# Patient Record
Sex: MALE | Race: WHITE | NOT HISPANIC OR LATINO | Employment: OTHER | URBAN - NONMETROPOLITAN AREA
[De-identification: names, ages, dates, MRNs, and addresses within clinical notes are randomized per-mention and may not be internally consistent; named-entity substitution may affect disease eponyms.]

---

## 2017-01-24 ENCOUNTER — OFFICE VISIT (OUTPATIENT)
Dept: PODIATRY | Facility: CLINIC | Age: 77
End: 2017-01-24

## 2017-01-24 VITALS
DIASTOLIC BLOOD PRESSURE: 70 MMHG | HEART RATE: 73 BPM | SYSTOLIC BLOOD PRESSURE: 127 MMHG | BODY MASS INDEX: 37.73 KG/M2 | HEIGHT: 74 IN | OXYGEN SATURATION: 93 % | WEIGHT: 294 LBS

## 2017-01-24 DIAGNOSIS — M79.672 LEFT FOOT PAIN: ICD-10-CM

## 2017-01-24 DIAGNOSIS — L84 CORNS: Primary | ICD-10-CM

## 2017-01-24 DIAGNOSIS — M20.42 HAMMER TOE OF LEFT FOOT: ICD-10-CM

## 2017-01-24 PROCEDURE — 99202 OFFICE O/P NEW SF 15 MIN: CPT | Performed by: PODIATRIST

## 2017-01-24 PROCEDURE — 11055 PARING/CUTG B9 HYPRKER LES 1: CPT | Performed by: PODIATRIST

## 2017-01-24 RX ORDER — LISINOPRIL AND HYDROCHLOROTHIAZIDE 20; 12.5 MG/1; MG/1
TABLET ORAL
COMMUNITY
Start: 2016-12-27 | End: 2020-02-25 | Stop reason: ALTCHOICE

## 2017-01-24 RX ORDER — MECLIZINE HYDROCHLORIDE 25 MG/1
TABLET ORAL
COMMUNITY
Start: 2017-01-16

## 2017-01-24 NOTE — PROGRESS NOTES
"Miles Degroot  1940  76 y.o. male   Patient presents with a sore on his left foot.    01/24/2017  Chief Complaint   Patient presents with   • Left Foot - Wound Check           History of Present Illness    Mr Degroot is a 76-year-old male who presents for evaluation of left foot pain.  He states that he noticed a firm spots to the bottom of his foot near his little toe starting a little over a week ago.  He states that this is painful on pressure and with weightbearing.  He states that the onset coincided with him wearing cheaper every day shoes but the pain has improved with wearing his nicer Sunday shoes.  He denies any known trauma or stepping on foreign objects.  He denies any prior skin lesions in the past.        No past medical history on file.      No past surgical history on file.      No family history on file.      Social History     Social History   • Marital status:      Spouse name: N/A   • Number of children: N/A   • Years of education: N/A     Occupational History   • Not on file.     Social History Main Topics   • Smoking status: Not on file   • Smokeless tobacco: Not on file   • Alcohol use Not on file   • Drug use: Not on file   • Sexual activity: Not on file     Other Topics Concern   • Not on file     Social History Narrative         Current Outpatient Prescriptions   Medication Sig Dispense Refill   • lisinopril-hydrochlorothiazide (PRINZIDE,ZESTORETIC) 20-12.5 MG per tablet      • meclizine (ANTIVERT) 25 MG tablet        No current facility-administered medications for this visit.          OBJECTIVE    Visit Vitals   • /70   • Pulse 73   • Ht 74\" (188 cm)   • Wt 294 lb (133 kg)   • SpO2 93%   • BMI 37.75 kg/m2         Review of Systems   Constitutional:  Denies recent weight loss, weight gain, fever or chills, no change in exercise tolerance  Musculoskeletal: Foot pain.   Skin:  Callus  Neurological: Denies paresthesias.  Psychiatric/Behavioral: Denies " depression      Physical Exam   Constitutional: he appears well-developed and well-nourished.   HEENT: Normocephalic. Atraumatic  CV: No tenderness. RRR  Resp: Non-labored respiration. No wheezes.   Psychiatric: he has a normal mood and affect. his   behavior is normal.      Lower Extremity Exam:  Vascular: DP/PT pulses palpable 1+.   Negative hair growth.   Minimal perimalleolar edema  CFT wnl  Neuro: Protective sensation intact, b/l.  DTRs intact  Integument: No open wounds.  Several punctate IPK to left forefoot. +pain on palpation of small lesion near 5th mtpj  Web spaces c/d/i  Musculoskeletal: LE muscle strength 5/5.   Gait normal  Semi-rigid hammertoe deformity toes 2-5 b/l.  Nails 1-5 b/l thickened  Ankle ROM decreased              ASSESSMENT AND PLAN    Miles was seen today for wound check.    Diagnoses and all orders for this visit:    Corns    Left foot pain    Hammer toe of left foot      -Comprehensive foot and ankle exam performed  -Radiographs ordered and reviewed  -Educated pt on diagnosis, etiology and treatment of plantar IPKs related to increased plantar pressure secondary to hammertoe deformity.  -Above noted painful IPK debrided with 15 blade to epidermis without incident.  -Educated pt on proper shoe gear, soft insert selection as well as local padding techniques.  -Recheck as needed          This document has been electronically signed by Willis Jennings DPM on January 25, 2017 9:48 AM         Willis Jennings DPM  1/25/2017  9:48 AM

## 2017-10-16 ENCOUNTER — OFFICE VISIT (OUTPATIENT)
Dept: OTOLARYNGOLOGY | Facility: CLINIC | Age: 77
End: 2017-10-16

## 2017-10-16 VITALS — TEMPERATURE: 98.9 F | WEIGHT: 298 LBS | HEIGHT: 75 IN | BODY MASS INDEX: 37.05 KG/M2

## 2017-10-16 DIAGNOSIS — R49.0 VOICE HOARSENESSES: ICD-10-CM

## 2017-10-16 DIAGNOSIS — J38.7 PRESBYLARYNX: ICD-10-CM

## 2017-10-16 DIAGNOSIS — R06.02 SHORTNESS OF BREATH: Primary | ICD-10-CM

## 2017-10-16 PROCEDURE — 99203 OFFICE O/P NEW LOW 30 MIN: CPT | Performed by: OTOLARYNGOLOGY

## 2017-10-16 PROCEDURE — 31575 DIAGNOSTIC LARYNGOSCOPY: CPT | Performed by: OTOLARYNGOLOGY

## 2017-10-16 RX ORDER — ASPIRIN 81 MG/1
81 TABLET, CHEWABLE ORAL DAILY
COMMUNITY

## 2017-10-16 NOTE — PROGRESS NOTES
Subjective   Miles Degroot is a 77 y.o. male.   CC: hoarseness and shortness of breath  History of Present Illness   Patient is referred because a history of hoarseness it doesn't bother the patient that much area he isn't known to have COPD shortness of breath.  Is not having neck masses.  Stop smoking over 30 years ago.  He did chew tobacco more recently.  He didn't he denies sore throat or losing his voice completely.  He has no hemoptysis or neck masses..      The following portions of the patient's history were reviewed and updated as appropriate: allergies, current medications, past family history, past medical history, past social history, past surgical history and problem list.      Miles Degroot reports that he has quit smoking. He has never used smokeless tobacco.  Patient is not a tobacco user and has been counseled for use of tobacco products    Family History   Problem Relation Age of Onset   • Cancer Other    • Diabetes Other    • Heart disease Other          Current Outpatient Prescriptions:   •  aspirin 81 MG chewable tablet, Chew 81 mg Daily., Disp: , Rfl:   •  lisinopril-hydrochlorothiazide (PRINZIDE,ZESTORETIC) 20-12.5 MG per tablet, , Disp: , Rfl:   •  meclizine (ANTIVERT) 25 MG tablet, , Disp: , Rfl:   •  mometasone-formoterol (DULERA) 200-5 MCG/ACT inhaler, as needed, Disp: , Rfl:     Allergies   Allergen Reactions   • Contrast Dye      IVP DYE       Past Medical History:   Diagnosis Date   • Bleeding tendency    • Emphysema of lung    • High blood pressure    • Prostate cancer          Review of Systems   HENT: Positive for voice change. Negative for sore throat and trouble swallowing.    Respiratory: Positive for cough and shortness of breath.    Hematological: Negative for adenopathy.   All other systems reviewed and are negative.          Objective   Physical Exam   Constitutional: He is oriented to person, place, and time. He appears well-developed and well-nourished.   HENT:    Head: Normocephalic and atraumatic.   Right Ear: Hearing, tympanic membrane, external ear and ear canal normal.   Left Ear: Hearing, tympanic membrane, external ear and ear canal normal.   Nose: Nasal deformity present. No mucosal edema, rhinorrhea or septal deviation. No epistaxis. Right sinus exhibits no maxillary sinus tenderness and no frontal sinus tenderness. Left sinus exhibits no maxillary sinus tenderness and no frontal sinus tenderness.   Mouth/Throat: Uvula is midline, oropharynx is clear and moist and mucous membranes are normal. No trismus in the jaw. Normal dentition. No oropharyngeal exudate or posterior oropharyngeal edema. Tonsils are 0 on the right. Tonsils are 1+ on the left.   Neck: Trachea normal and normal range of motion. Neck supple. No JVD present. No tracheal deviation present. No thyromegaly present.       Cardiovascular: Normal rate.    Pulmonary/Chest: Effort normal.   Musculoskeletal: Normal range of motion.   Lymphadenopathy:        Head (right side): No submental, no submandibular, no tonsillar, no preauricular, no posterior auricular and no occipital adenopathy present.        Head (left side): No submental, no submandibular, no tonsillar, no preauricular, no posterior auricular and no occipital adenopathy present.     He has no cervical adenopathy.        Right cervical: No superficial cervical, no deep cervical and no posterior cervical adenopathy present.       Left cervical: No superficial cervical, no deep cervical and no posterior cervical adenopathy present.   Neurological: He is alert and oriented to person, place, and time. No cranial nerve deficit.   Skin: Skin is warm.   Psychiatric: He has a normal mood and affect. His speech is normal and behavior is normal. Thought content normal.   Nursing note and vitals reviewed.    Procedure Note    Pre-operative Diagnosis:   Chief Complaint   Patient presents with   • other     hoarseness       Post-operative Diagnosis:  same    Anesthesia: topical with xylocaine and neosynephrine    Endoscopy Type:  Flexible Laryngoscopy    Procedure Details:    The patient was placed in the sitting position.  After topical anesthesia and decongestion, the 4 mm laryngoscope was passed.  The nasal cavities, nasopharynx, oropharynx, hypopharynx, and larynx were all examined.  Vocal cords were examined during respiration and phonation.  The following findings were noted:    Findings:  Present the larynx good mobility of the vocal cords slight sagging.    Other significant abnormalities larynx hypopharynx epiglottis or base of tongue    Condition:  Stable.  Patient tolerated procedure well.    Complications:  None      Assessment/Plan   Miles was seen today for other.    Diagnoses and all orders for this visit:    Shortness of breath    Presbylarynx    Voice hoarsenesses       reassured no significant serious pathology.  Patient does not want to go through with speech therapy or surgical  Procedures.  Told him if this voice worsens only have to recheck I also did reassure him there is no evidence of cancer or other mass.

## 2019-08-06 ENCOUNTER — OFFICE VISIT (OUTPATIENT)
Dept: PODIATRY | Facility: CLINIC | Age: 79
End: 2019-08-06

## 2019-08-06 VITALS — HEIGHT: 75 IN | WEIGHT: 298 LBS | BODY MASS INDEX: 37.05 KG/M2 | OXYGEN SATURATION: 98 % | HEART RATE: 89 BPM

## 2019-08-06 DIAGNOSIS — B35.1 ONYCHOMYCOSIS: Primary | ICD-10-CM

## 2019-08-06 DIAGNOSIS — M79.674 PAIN OF TOE OF RIGHT FOOT: ICD-10-CM

## 2019-08-06 PROCEDURE — 99212 OFFICE O/P EST SF 10 MIN: CPT | Performed by: PODIATRIST

## 2019-08-06 NOTE — PROGRESS NOTES
"Miles Degroot  1940  79 y.o. male     Patient presents to clinic with nail care 8/06/19 08/06/2019  Chief Complaint   Patient presents with   • Left Foot - nail care   • Right Foot - nail care           History of Present Illness    Miles Degroot is a 79 y.o. male            Past Medical History:   Diagnosis Date   • Bleeding tendency (CMS/HCC)    • Emphysema of lung (CMS/HCC)    • High blood pressure    • Prostate cancer (CMS/HCC)          Past Surgical History:   Procedure Laterality Date   • EYE SURGERY     • TOTAL KNEE ARTHROPLASTY           Family History   Problem Relation Age of Onset   • Cancer Other    • Diabetes Other    • Heart disease Other          Social History     Socioeconomic History   • Marital status:      Spouse name: Not on file   • Number of children: Not on file   • Years of education: Not on file   • Highest education level: Not on file   Tobacco Use   • Smoking status: Former Smoker   • Smokeless tobacco: Never Used         Current Outpatient Medications   Medication Sig Dispense Refill   • aspirin 81 MG chewable tablet Chew 81 mg Daily.     • lisinopril-hydrochlorothiazide (PRINZIDE,ZESTORETIC) 20-12.5 MG per tablet      • meclizine (ANTIVERT) 25 MG tablet      • mometasone-formoterol (DULERA) 200-5 MCG/ACT inhaler as needed       No current facility-administered medications for this visit.          OBJECTIVE    Pulse 89   Ht 189.2 cm (74.5\")   Wt 135 kg (298 lb)   SpO2 98%   BMI 37.75 kg/m²       Review of Systems      ***            ASSESSMENT AND PLAN    There are no diagnoses linked to this encounter.          This document has been electronically signed by Elaine Brown MA on August 6, 2019 4:26 PM     EMR Dragon/Transcription disclaimer:   Much of this encounter note is an electronic transcription/translation of spoken language to printed text. The electronic translation of spoken language may permit erroneous, or at times, nonsensical words or phrases to be " inadvertently transcribed; Although I have reviewed the note for such errors, some may still exist.    Elaine Brown MA  8/6/2019  4:26 PM

## 2019-08-07 NOTE — PROGRESS NOTES
"Miles Degroot  1940  79 y.o. male   Patient presents with right great toenail issues, pain    08/06/2019    Chief Complaint   Patient presents with   • Left Foot - nail care   • Right Foot - nail care           History of Present Illness    Mr Degroot is a 79-year-old male last seen 2017 for an unrelated issue presents today for evaluation of issues with bilateral big toes.  He feels that he has an excess amount of skin under his toes which makes them difficult to trim and care for.  He states that he tried to scrape some of the skin off the tip of his right toe a couple weeks ago and started to bleed and he believes getting infected.  He did begin soaking and applied topical antibiotic which she states improved the problem.    Past Medical History:   Diagnosis Date   • Bleeding tendency (CMS/HCC)    • Emphysema of lung (CMS/HCC)    • High blood pressure    • Prostate cancer (CMS/HCC)          Past Surgical History:   Procedure Laterality Date   • EYE SURGERY     • TOTAL KNEE ARTHROPLASTY           Family History   Problem Relation Age of Onset   • Cancer Other    • Diabetes Other    • Heart disease Other          Social History     Socioeconomic History   • Marital status:      Spouse name: Not on file   • Number of children: Not on file   • Years of education: Not on file   • Highest education level: Not on file   Tobacco Use   • Smoking status: Former Smoker   • Smokeless tobacco: Never Used         Current Outpatient Medications   Medication Sig Dispense Refill   • aspirin 81 MG chewable tablet Chew 81 mg Daily.     • lisinopril-hydrochlorothiazide (PRINZIDE,ZESTORETIC) 20-12.5 MG per tablet      • meclizine (ANTIVERT) 25 MG tablet      • mometasone-formoterol (DULERA) 200-5 MCG/ACT inhaler as needed       No current facility-administered medications for this visit.          OBJECTIVE    Pulse 89   Ht 189.2 cm (74.5\")   Wt 135 kg (298 lb)   SpO2 98%   BMI 37.75 kg/m²       Review of Systems "   Constitutional:  Denies recent weight loss, weight gain, fever or chills, no change in exercise tolerance  Musculoskeletal: Foot pain.   Skin:  Callus  Neurological: Denies paresthesias.  Psychiatric/Behavioral: Denies depression      Physical Exam   Constitutional: he appears well-developed and well-nourished.   HEENT: Normocephalic. Atraumatic  CV: No tenderness. RRR  Resp: Non-labored respiration. No wheezes.   Psychiatric: he has a normal mood and affect. his   behavior is normal.      Lower Extremity Exam:  Vascular: DP/PT pulses palpable 1+.   Negative hair growth.   Minimal perimalleolar edema  CFT wnl  Neuro: Protective sensation intact, b/l.  DTRs intact  Integument: No open wounds.  Mild onychomycosis to bilateral great toes with incurvated medial lateral nail edges.  This does protrude the nail bed.  No open wounds.  No erythema.  No tenderness palpation.  Web spaces c/d/i  Musculoskeletal: LE muscle strength 5/5.   Gait normal  Semi-rigid hammertoe deformity toes 2-5 b/l.  Nails 1-5 b/l thickened  Ankle ROM decreased              ASSESSMENT AND PLAN    Miles was seen today for nail care and nail care.    Diagnoses and all orders for this visit:    Onychomycosis    Pain of toe of right foot      -Comprehensive foot and ankle exam performed  -Educated patient on findings of bilateral nails which are slightly thickened due to mild onychomycosis as well as incurvated which causes anterior displacement of the nail bed.  I advised him that his treatment options are continued intermittent debridement and care not to cut the skin versus total nail removal.  Patient states is not bothersome enough to warrant nail removal at this time.  -Recheck as needed          This document has been electronically signed by Willis Jennings DPM on August 7, 2019 10:25 AM         Willis Jennings DPM  8/7/2019  10:25 AM

## 2020-02-21 DIAGNOSIS — R05.9 COUGH: Primary | ICD-10-CM

## 2020-02-24 ENCOUNTER — OFFICE VISIT (OUTPATIENT)
Dept: PULMONOLOGY | Facility: CLINIC | Age: 80
End: 2020-02-24

## 2020-02-24 ENCOUNTER — PROCEDURE VISIT (OUTPATIENT)
Dept: PULMONOLOGY | Facility: CLINIC | Age: 80
End: 2020-02-24

## 2020-02-24 ENCOUNTER — HOSPITAL ENCOUNTER (OUTPATIENT)
Dept: GENERAL RADIOLOGY | Facility: HOSPITAL | Age: 80
Discharge: HOME | End: 2020-02-24
Admitting: INTERNAL MEDICINE

## 2020-02-24 VITALS
BODY MASS INDEX: 37.1 KG/M2 | OXYGEN SATURATION: 96 % | WEIGHT: 298.4 LBS | HEIGHT: 75 IN | SYSTOLIC BLOOD PRESSURE: 160 MMHG | HEART RATE: 98 BPM | DIASTOLIC BLOOD PRESSURE: 84 MMHG

## 2020-02-24 DIAGNOSIS — R05.9 COUGH: ICD-10-CM

## 2020-02-24 DIAGNOSIS — J42 CHRONIC BRONCHITIS, UNSPECIFIED CHRONIC BRONCHITIS TYPE (HCC): Primary | ICD-10-CM

## 2020-02-24 PROCEDURE — 99204 OFFICE O/P NEW MOD 45 MIN: CPT | Performed by: INTERNAL MEDICINE

## 2020-02-24 PROCEDURE — 94010 BREATHING CAPACITY TEST: CPT | Performed by: INTERNAL MEDICINE

## 2020-02-24 PROCEDURE — 96372 THER/PROPH/DIAG INJ SC/IM: CPT | Performed by: INTERNAL MEDICINE

## 2020-02-24 PROCEDURE — 71046 X-RAY EXAM CHEST 2 VIEWS: CPT

## 2020-02-24 RX ORDER — OMEGA-3 FATTY ACIDS/FISH OIL 300-1000MG
200 CAPSULE ORAL
COMMUNITY

## 2020-02-24 RX ORDER — DIAZEPAM 10 MG/1
TABLET ORAL
COMMUNITY
Start: 2020-01-10

## 2020-02-24 RX ORDER — DOXYCYCLINE HYCLATE 100 MG/1
CAPSULE ORAL
COMMUNITY
Start: 2020-02-18 | End: 2020-10-28

## 2020-02-24 RX ORDER — METHYLPREDNISOLONE ACETATE 40 MG/ML
40 INJECTION, SUSPENSION INTRA-ARTICULAR; INTRALESIONAL; INTRAMUSCULAR; SOFT TISSUE ONCE
Status: DISCONTINUED | OUTPATIENT
Start: 2020-02-24 | End: 2020-06-09

## 2020-02-24 RX ORDER — METHYLPREDNISOLONE ACETATE 80 MG/ML
80 INJECTION, SUSPENSION INTRA-ARTICULAR; INTRALESIONAL; INTRAMUSCULAR; SOFT TISSUE ONCE
Status: DISCONTINUED | OUTPATIENT
Start: 2020-02-24 | End: 2020-02-24

## 2020-02-24 RX ORDER — DOCUSATE SODIUM 100 MG/1
100 CAPSULE, LIQUID FILLED ORAL
COMMUNITY

## 2020-02-24 RX ORDER — MONTELUKAST SODIUM 10 MG/1
TABLET ORAL
COMMUNITY
Start: 2019-12-27

## 2020-02-24 RX ORDER — ALBUTEROL SULFATE 90 UG/1
AEROSOL, METERED RESPIRATORY (INHALATION)
COMMUNITY
Start: 2019-12-27

## 2020-02-24 RX ORDER — PREDNISONE 10 MG/1
TABLET ORAL
Qty: 21 TABLET | Refills: 0 | Status: SHIPPED | OUTPATIENT
Start: 2020-02-24 | End: 2020-10-28

## 2020-02-24 NOTE — PROGRESS NOTES
Miles Degroot is a 79 y.o. male.     Chief Complaint   Patient presents with   • Establish Care       History of Present Illness   This 79-year-old gentleman is here for breathing evaluation.  He has had shortness of breath for the last 10 years characterized by cough wheeze dyspnea on walking a few 100 feet and sputum production.  He also has a history of melanoma high blood pressure and arthritis.  Surgical history prostate biopsy knee replacements.  Medications please see his list.  Medication allergies contrast dye.  Family history is positive for cancer lung disease and diabetes.  Social history he worked in around the [email or web address] for a number of years and smokes cigarettes for at least 24 years.  He denies chest pain or hemoptysis    The following portions of the patient's history were reviewed and updated as appropriate: allergies, current medications, past family history, past medical history, past social history, past surgical history and problem list.    Review of Systems   Constitutional: Negative for activity change, chills, fatigue, fever and unexpected weight change.   HENT: Negative for congestion, dental problem, ear discharge, ear pain, facial swelling, hearing loss, nosebleeds, postnasal drip, rhinorrhea, sinus pressure, sneezing, sore throat, tinnitus, trouble swallowing and voice change.    Eyes: Negative.  Negative for photophobia, pain, discharge, redness, itching and visual disturbance.   Respiratory: Positive for cough, chest tightness, shortness of breath and wheezing.    Cardiovascular: Negative for chest pain, palpitations and leg swelling.   Gastrointestinal: Negative for abdominal distention, abdominal pain and vomiting.   Endocrine: Negative.  Negative for cold intolerance, heat intolerance, polydipsia and polyphagia.   Genitourinary: Positive for frequency. Negative for decreased urine volume, dysuria, enuresis, flank pain, hematuria and urgency.   Musculoskeletal: Positive for  "arthralgias. Negative for back pain, gait problem, joint swelling, myalgias and neck pain.   Skin: Negative for color change, pallor, rash and wound.   Allergic/Immunologic: Negative.  Negative for environmental allergies, food allergies and immunocompromised state.   Neurological: Negative.  Negative for dizziness, tremors, seizures, syncope, facial asymmetry, speech difficulty, weakness, light-headedness, numbness and headaches.   Hematological: Negative for adenopathy. Does not bruise/bleed easily.   Psychiatric/Behavioral: Negative for agitation, behavioral problems, confusion, decreased concentration, hallucinations and self-injury. The patient is not hyperactive.    All other systems reviewed and are negative.      /84 (BP Location: Left arm, Patient Position: Sitting, Cuff Size: Adult)   Pulse 98   Ht 189.2 cm (74.5\")   Wt 135 kg (298 lb 6.4 oz)   SpO2 96%   BMI 37.80 kg/m²   Physical Exam   Constitutional: He appears well-developed and well-nourished. Distressed:  Dyspneic white male in a transport chair mild respiratory distress, mild cough, patient walks with an antalgic gait.   HENT:   Head: Normocephalic.   Mouth/Throat: No oropharyngeal exudate.   Eyes: Pupils are equal, round, and reactive to light. Conjunctivae are normal. Right eye exhibits no discharge. Left eye exhibits no discharge. No scleral icterus.   Neck: Normal range of motion. Neck supple. No JVD present. No tracheal deviation present. No thyromegaly present.   Cardiovascular: Normal rate, regular rhythm, normal heart sounds and intact distal pulses. Exam reveals no gallop and no friction rub.   No murmur heard.  Pulmonary/Chest: He is in respiratory distress. He has wheezes. He has rales. He exhibits no tenderness.   Abdominal: Bowel sounds are normal. He exhibits no distension and no mass. There is no tenderness. There is no guarding.   Musculoskeletal: He exhibits no tenderness or deformity.   Lymphadenopathy:     He has no " cervical adenopathy.   Neurological: He is alert. He has normal reflexes. He displays normal reflexes. No cranial nerve deficit. He exhibits normal muscle tone. Coordination normal.   Skin: Skin is warm and dry. No rash noted. He is not diaphoretic. No pallor.   Psychiatric: He has a normal mood and affect. His behavior is normal. Judgment and thought content normal.     Chest x-ray mild increased markings no masses or infiltrates, spirometry reveals moderate to severe obstruction    Assessment/Plan   Milse was seen today for establish care.    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

## 2020-02-25 ENCOUNTER — TELEPHONE (OUTPATIENT)
Dept: PULMONOLOGY | Facility: CLINIC | Age: 80
End: 2020-02-25

## 2020-02-25 RX ORDER — AMPICILLIN TRIHYDRATE 250 MG
500 CAPSULE ORAL 2 TIMES DAILY
COMMUNITY

## 2020-02-25 RX ORDER — CHLORAL HYDRATE 500 MG
1000 CAPSULE ORAL 2 TIMES DAILY
COMMUNITY

## 2020-02-25 RX ORDER — PROMETHAZINE HYDROCHLORIDE AND CODEINE PHOSPHATE 6.25; 1 MG/5ML; MG/5ML
1 SOLUTION ORAL EVERY 6 HOURS PRN
COMMUNITY
Start: 2019-12-03 | End: 2020-10-28

## 2020-02-25 RX ORDER — TAMSULOSIN HYDROCHLORIDE 0.4 MG/1
0.4 CAPSULE ORAL DAILY
COMMUNITY
Start: 2019-12-27

## 2020-02-25 RX ORDER — OXYBUTYNIN CHLORIDE 10 MG/1
10 TABLET, EXTENDED RELEASE ORAL DAILY
COMMUNITY
Start: 2019-12-27 | End: 2020-10-28

## 2020-02-25 RX ORDER — LOSARTAN POTASSIUM 100 MG/1
100 TABLET ORAL DAILY
COMMUNITY
Start: 2019-10-10 | End: 2020-10-28

## 2020-02-25 RX ORDER — AMLODIPINE BESYLATE 5 MG/1
5 TABLET ORAL DAILY
COMMUNITY
Start: 2020-02-18 | End: 2020-10-28

## 2020-02-25 NOTE — TELEPHONE ENCOUNTER
Called Elaine back and since the script is almost the same as Dr Crain's, the insurance will not authorize the new script at this time. I talked with the pt and Elaine and all agreed to wait until the current script is out and then see how pt is feeling. If need be, he can  the next script after that time. I reminded pt of his upcoming appt while talking with his wife on the phone and updated the medication list. I told pt's wife to give us a call if he needs anything else. She wanted us to note that he was seen in urgent care yesterday due to rib pain after coughing hard. He was given some cough syrup and was told that he probably pulled a muscle from coughing so hard.   (The visit is now on his chart through care everywhere.)

## 2020-02-25 NOTE — TELEPHONE ENCOUNTER
----- Message from Amalia Herrera sent at 2/25/2020  9:04 AM CST -----  Regarding:  Dr Crain patient  Elaine with [facility] has called stating Dr Crain ordered a prescription for PREDNISONE for this patient yesterday.  She wants to let Dr Crain know that Dr Gasca also ordered this RX on the 18th.  Please return her call with further instructions.  The # is [phone].  Thank you

## 2020-03-02 ENCOUNTER — APPOINTMENT (OUTPATIENT)
Dept: CT IMAGING | Facility: HOSPITAL | Age: 80
End: 2020-03-02

## 2020-03-02 ENCOUNTER — HOSPITAL ENCOUNTER (EMERGENCY)
Facility: HOSPITAL | Age: 80
Discharge: HOME | End: 2020-03-02
Attending: EMERGENCY MEDICINE | Admitting: EMERGENCY MEDICINE

## 2020-03-02 VITALS
DIASTOLIC BLOOD PRESSURE: 94 MMHG | HEIGHT: 75 IN | BODY MASS INDEX: 36.56 KG/M2 | SYSTOLIC BLOOD PRESSURE: 176 MMHG | HEART RATE: 81 BPM | TEMPERATURE: 98.2 F | WEIGHT: 294 LBS | OXYGEN SATURATION: 92 % | RESPIRATION RATE: 20 BRPM

## 2020-03-02 DIAGNOSIS — J90 PLEURAL EFFUSION, LEFT: ICD-10-CM

## 2020-03-02 DIAGNOSIS — K80.20 CALCULUS OF GALLBLADDER WITHOUT CHOLECYSTITIS WITHOUT OBSTRUCTION: ICD-10-CM

## 2020-03-02 DIAGNOSIS — S30.1XXA HEMATOMA OF FLANK: Primary | ICD-10-CM

## 2020-03-02 DIAGNOSIS — D64.9 ANEMIA, UNSPECIFIED TYPE: ICD-10-CM

## 2020-03-02 LAB
ALBUMIN SERPL-MCNC: 3.8 G/DL (ref 3.5–5.2)
ALBUMIN/GLOB SERPL: 1.4 G/DL
ALP SERPL-CCNC: 72 U/L (ref 39–117)
ALT SERPL W P-5'-P-CCNC: 16 U/L (ref 1–41)
ANION GAP SERPL CALCULATED.3IONS-SCNC: 11 MMOL/L (ref 5–15)
APTT PPP: 25.6 SECONDS (ref 20–40.3)
AST SERPL-CCNC: 20 U/L (ref 1–40)
BASOPHILS # BLD AUTO: 0.05 10*3/MM3 (ref 0–0.2)
BASOPHILS NFR BLD AUTO: 0.3 % (ref 0–1.5)
BILIRUB SERPL-MCNC: 0.9 MG/DL (ref 0.2–1.2)
BILIRUB UR QL STRIP: NEGATIVE
BUN BLD-MCNC: 21 MG/DL (ref 8–23)
BUN/CREAT SERPL: 21.9 (ref 7–25)
CALCIUM SPEC-SCNC: 9.2 MG/DL (ref 8.6–10.5)
CHLORIDE SERPL-SCNC: 101 MMOL/L (ref 98–107)
CLARITY UR: CLEAR
CO2 SERPL-SCNC: 25 MMOL/L (ref 22–29)
COLOR UR: YELLOW
CREAT BLD-MCNC: 0.96 MG/DL (ref 0.76–1.27)
DEPRECATED RDW RBC AUTO: 45.8 FL (ref 37–54)
EOSINOPHIL # BLD AUTO: 0.02 10*3/MM3 (ref 0–0.4)
EOSINOPHIL NFR BLD AUTO: 0.1 % (ref 0.3–6.2)
ERYTHROCYTE [DISTWIDTH] IN BLOOD BY AUTOMATED COUNT: 14.2 % (ref 12.3–15.4)
GFR SERPL CREATININE-BSD FRML MDRD: 76 ML/MIN/1.73
GLOBULIN UR ELPH-MCNC: 2.7 GM/DL
GLUCOSE BLD-MCNC: 143 MG/DL (ref 65–99)
GLUCOSE UR STRIP-MCNC: NEGATIVE MG/DL
HCT VFR BLD AUTO: 32.3 % (ref 37.5–51)
HGB BLD-MCNC: 10.6 G/DL (ref 13–17.7)
HGB UR QL STRIP.AUTO: NEGATIVE
HOLD SPECIMEN: NORMAL
IMM GRANULOCYTES # BLD AUTO: 0.31 10*3/MM3 (ref 0–0.05)
IMM GRANULOCYTES NFR BLD AUTO: 2 % (ref 0–0.5)
INR PPP: 1.02 (ref 0.8–1.2)
KETONES UR QL STRIP: NEGATIVE
LEUKOCYTE ESTERASE UR QL STRIP.AUTO: NEGATIVE
LYMPHOCYTES # BLD AUTO: 1.36 10*3/MM3 (ref 0.7–3.1)
LYMPHOCYTES NFR BLD AUTO: 8.7 % (ref 19.6–45.3)
MCH RBC QN AUTO: 29.1 PG (ref 26.6–33)
MCHC RBC AUTO-ENTMCNC: 32.8 G/DL (ref 31.5–35.7)
MCV RBC AUTO: 88.7 FL (ref 79–97)
MONOCYTES # BLD AUTO: 1.01 10*3/MM3 (ref 0.1–0.9)
MONOCYTES NFR BLD AUTO: 6.5 % (ref 5–12)
NEUTROPHILS # BLD AUTO: 12.83 10*3/MM3 (ref 1.7–7)
NEUTROPHILS NFR BLD AUTO: 82.4 % (ref 42.7–76)
NITRITE UR QL STRIP: NEGATIVE
NRBC BLD AUTO-RTO: 0.1 /100 WBC (ref 0–0.2)
PH UR STRIP.AUTO: 7 [PH] (ref 5–9)
PLATELET # BLD AUTO: 372 10*3/MM3 (ref 140–450)
PMV BLD AUTO: 8.7 FL (ref 6–12)
POTASSIUM BLD-SCNC: 4 MMOL/L (ref 3.5–5.2)
PROT SERPL-MCNC: 6.5 G/DL (ref 6–8.5)
PROT UR QL STRIP: NEGATIVE
PROTHROMBIN TIME: 13.2 SECONDS (ref 11.1–15.3)
RBC # BLD AUTO: 3.64 10*6/MM3 (ref 4.14–5.8)
SODIUM BLD-SCNC: 137 MMOL/L (ref 136–145)
SP GR UR STRIP: 1.01 (ref 1–1.03)
UROBILINOGEN UR QL STRIP: NORMAL
WBC NRBC COR # BLD: 15.58 10*3/MM3 (ref 3.4–10.8)

## 2020-03-02 PROCEDURE — 85025 COMPLETE CBC W/AUTO DIFF WBC: CPT | Performed by: EMERGENCY MEDICINE

## 2020-03-02 PROCEDURE — 74176 CT ABD & PELVIS W/O CONTRAST: CPT

## 2020-03-02 PROCEDURE — 85610 PROTHROMBIN TIME: CPT | Performed by: EMERGENCY MEDICINE

## 2020-03-02 PROCEDURE — 80053 COMPREHEN METABOLIC PANEL: CPT | Performed by: EMERGENCY MEDICINE

## 2020-03-02 PROCEDURE — 85730 THROMBOPLASTIN TIME PARTIAL: CPT | Performed by: EMERGENCY MEDICINE

## 2020-03-02 PROCEDURE — 71250 CT THORAX DX C-: CPT

## 2020-03-02 PROCEDURE — 99284 EMERGENCY DEPT VISIT MOD MDM: CPT

## 2020-03-02 PROCEDURE — 81003 URINALYSIS AUTO W/O SCOPE: CPT | Performed by: EMERGENCY MEDICINE

## 2020-03-02 RX ORDER — SODIUM CHLORIDE 0.9 % (FLUSH) 0.9 %
10 SYRINGE (ML) INJECTION AS NEEDED
Status: DISCONTINUED | OUTPATIENT
Start: 2020-03-02 | End: 2020-03-02 | Stop reason: HOSPADM

## 2020-03-02 RX ORDER — HYDROCODONE BITARTRATE AND ACETAMINOPHEN 5; 325 MG/1; MG/1
1 TABLET ORAL ONCE
Status: COMPLETED | OUTPATIENT
Start: 2020-03-02 | End: 2020-03-02

## 2020-03-02 RX ADMIN — HYDROCODONE BITARTRATE AND ACETAMINOPHEN 1 TABLET: 5; 325 TABLET ORAL at 16:57

## 2020-03-02 NOTE — DISCHARGE INSTRUCTIONS
Return ED fever, chest pain, abdominal pain, shortness of air, bleeding, worse condition, other concerns

## 2020-03-02 NOTE — ED NOTES
Pt coughed the other day and felt a pop on his left side. Pt is now beginning to see a bruise around the side he felt pop.      Alysha Singh  03/02/20 [id number]

## 2020-03-02 NOTE — ED PROVIDER NOTES
Subjective   78yo male pmh significant copd/htn/bph presents ED c/o 1wk hx left flank hematoma extending to pelvis/lower abdominal wall/LLE over the past 1 week.  Pt notes onset after vigorous coughing episode et brief discomfort left flank.  ROS neg fever/chills/chest pain/abd pain/coagulopathy/melena/hematochoezia/hematemesis/hematuria.      History provided by:  Patient  Illness   Severity:  Moderate  Duration:  1 week  Chronicity:  New      Review of Systems   Constitutional: Negative.    HENT: Negative.    Respiratory: Negative.    Cardiovascular: Negative.    Gastrointestinal: Negative.    Genitourinary: Negative.    Musculoskeletal: Negative.    Skin: Positive for color change.   All other systems reviewed and are negative.      Past Medical History:   Diagnosis Date   • Bleeding tendency (CMS/HCC)    • Emphysema of lung (CMS/HCC)    • High blood pressure    • Prostate cancer (CMS/HCC)        Allergies   Allergen Reactions   • Contrast Dye      IVP DYE       Past Surgical History:   Procedure Laterality Date   • EYE SURGERY     • TOTAL KNEE ARTHROPLASTY         Family History   Problem Relation Age of Onset   • Cancer Other    • Diabetes Other    • Heart disease Other        Social History     Socioeconomic History   • Marital status:      Spouse name: Not on file   • Number of children: Not on file   • Years of education: Not on file   • Highest education level: Not on file   Tobacco Use   • Smoking status: Former Smoker     Last attempt to quit: 1982     Years since quittin.1   • Smokeless tobacco: Never Used   Substance and Sexual Activity   • Alcohol use: Yes     Comment: shot whiskey 2-3 nights/ week   • Drug use: Defer   • Sexual activity: Defer           Objective   Physical Exam   Constitutional: He is oriented to person, place, and time. He appears well-developed and well-nourished.   HENT:   Head: Normocephalic and atraumatic.   Mouth/Throat: Oropharynx is clear and moist.   Eyes:  Pupils are equal, round, and reactive to light.   Neck: Normal range of motion. Neck supple. No JVD present. No tracheal deviation present.   Cardiovascular: Normal rate, regular rhythm, normal heart sounds and intact distal pulses. Exam reveals no gallop and no friction rub.   No murmur heard.  Pulmonary/Chest: Effort normal and breath sounds normal. He has no wheezes. He has no rales.   Abdominal: Soft. Bowel sounds are normal. He exhibits no distension and no mass. There is no tenderness. There is no rebound and no guarding.   Musculoskeletal: He exhibits no edema.   Lymphadenopathy:     He has no cervical adenopathy.   Neurological: He is alert and oriented to person, place, and time.   Skin: Skin is warm and dry. Ecchymosis noted.        Nursing note and vitals reviewed.      Procedures           ED Course      Labs Reviewed   COMPREHENSIVE METABOLIC PANEL - Abnormal; Notable for the following components:       Result Value    Glucose 143 (*)     All other components within normal limits    Narrative:     GFR Normal >60  Chronic Kidney Disease <60  Kidney Failure <15     CBC WITH AUTO DIFFERENTIAL - Abnormal; Notable for the following components:    WBC 15.58 (*)     RBC 3.64 (*)     Hemoglobin 10.6 (*)     Hematocrit 32.3 (*)     Neutrophil % 82.4 (*)     Lymphocyte % 8.7 (*)     Eosinophil % 0.1 (*)     Immature Grans % 2.0 (*)     Neutrophils, Absolute 12.83 (*)     Monocytes, Absolute 1.01 (*)     Immature Grans, Absolute 0.31 (*)     All other components within normal limits   PROTIME-INR - Normal    Narrative:     Therapeutic range for most indications is 2.0-3.0 INR,  or 2.5-3.5 for mechanical heart valves.   APTT - Normal    Narrative:     The recommended Heparin therapeutic range is 68-97 seconds.   URINALYSIS W/ MICROSCOPIC IF INDICATED (NO CULTURE) - Normal    Narrative:     Urine microscopic not indicated.   CBC AND DIFFERENTIAL    Narrative:     The following orders were created for panel order CBC  & Differential.  Procedure                               Abnormality         Status                     ---------                               -----------         ------                     CBC Auto Differential[[id number]]        Abnormal            Final result                 Please view results for these tests on the individual orders.   EXTRA TUBES    Narrative:     The following orders were created for panel order Extra Tubes.  Procedure                               Abnormality         Status                     ---------                               -----------         ------                     Gold Top - SST[[id number]]                                   Final result                 Please view results for these tests on the individual orders.   GOLD TOP - SST     Xr Chest 2 View    Result Date: 2/24/2020  Narrative: [facility] [place] Patient Name: ELZA PALOMINO ATTENDING: REFERRING: ROOSEVELT RENTERIA ORDERING: ROOSEVELT RENTERIA ----------------------- PROCEDURE: Chest, PA and lateral DATE OF EXAM: 2/24/2020 HISTORY: Cough PA and lateral views of the chest were obtained. No previous studies are available for comparison. FINDINGS: The lungs are satisfactorily expanded and clear of infiltrates or effusions. There is hyperexpansion of the lungs with flattening of the hemidiaphragms. The heart size is normal. The vasculature does not appear congested and costophrenic angles are clear.     Impression: Hyperinflation. No active disease. Electronically signed by:  Kodak Negrete MD  2/24/2020 8:41 AM CST Workstation: [id number]    Ct Chest Without Contrast    Result Date: 3/2/2020  Narrative: PROCEDURE: CT chest abdomen and pelvis without contrast REASON FOR EXAM: ecchymoses This exam was performed according to our departmental dose-optimization program, which includes automated exposure control, adjustment of the mA and/or kV according to patient size and/or use of iterative reconstruction technique.  FINDINGS:  Axial computer tomography sequential imaging was performed from the thoracic inlet through the symphysis pubis without administration of IV contrast. .Sagittal and coronal reformation was performed . Mediastinal structures of the chest are normal. There is no lymphadenopathy or pericardial effusion. Very small left pleural effusion. Pleural spaces are otherwise unremarkable. Right lung base small linear opacity. Lungs are otherwise clear. 2 posterior segment right lobe of liver small oval hypodense lesions which have Hounsfield unit below 20 with the largest measuring 2.4 cm in greatest diameter. These would be consistent with benign hepatic cyst. The liver is otherwise unremarkable. Multiple small gallstones are seen dependently within the gallbladder. The gallbladder is otherwise unremarkable. The biliary system is normal. The pancreas is normal. The spleen is normal. Bilateral adrenal glands are normal. Right kidney mid zone 2.1 cm simple renal cortical cyst. The right kidney and ureter are otherwise unremarkable. The left kidney and ureter are normal. The bladder is normal. The prostate appears within normal limits. The hollow viscera is normal. The appendix is not definitively identified. However, there are no inflammatory changes around the cecum to suggest abnormality. No lymphadenopathy in the abdomen or the pelvis. No acute osseous abnormality. Nonspecific left abdominal wall internal abdominal oblique muscle, external abdominal oblique muscle, and transverse abdominal muscle focal fullness in the upper and midabdomen.     Impression: 1. Very small left pleural effusion. 2. Right lung base small linear opacities suspicious for discoid atelectasis. 3. 2 posterior segment right lobe of liver benign hepatic cyst. 4. Cholelithiasis. 5.Nonspecific left abdominal wall internal abdominal oblique muscle, external abdominal oblique muscle, and transverse abdominal muscle focal fullness in the upper and  midabdomen. The differential for this finding includes intramuscular hematoma versus less likely metastatic disease or peritoneal metastatic disease. Recommend clinical correlation. 6. Right kidney mid zone small simple renal cortical cyst. 7. Otherwise unremarkable unenhanced CT abdomen pelvis study. Electronically signed by:  Wes Daily MD  3/2/2020 4:37 PM CST Workstation: [id number]                                         [facility]    Final diagnoses:   Hematoma of flank   Anemia, unspecified type   Calculus of gallbladder without cholecystitis without obstruction   Pleural effusion, left            Bravo Fish MD  03/02/20 1021

## 2020-03-02 NOTE — ED TRIAGE NOTES
Pt was seen by pulmonologist 1 week ago and dx COPD. Pt coughed sometime after that appointment and had severe pain to his left flank immediately. He was seen at  later that day and received xr; no rib fx identified. Pt began having bruising to left flank 5 days ago, which has since expanded down to entire pelvic area.

## 2020-03-09 ENCOUNTER — OFFICE VISIT (OUTPATIENT)
Dept: PULMONOLOGY | Facility: CLINIC | Age: 80
End: 2020-03-09

## 2020-03-09 VITALS
WEIGHT: 300 LBS | BODY MASS INDEX: 37.3 KG/M2 | HEIGHT: 75 IN | DIASTOLIC BLOOD PRESSURE: 72 MMHG | SYSTOLIC BLOOD PRESSURE: 130 MMHG | OXYGEN SATURATION: 97 % | HEART RATE: 83 BPM

## 2020-03-09 DIAGNOSIS — J44.1 CHRONIC OBSTRUCTIVE PULMONARY DISEASE WITH ACUTE EXACERBATION (HCC): Primary | ICD-10-CM

## 2020-03-09 PROCEDURE — 99213 OFFICE O/P EST LOW 20 MIN: CPT | Performed by: INTERNAL MEDICINE

## 2020-03-09 NOTE — PROGRESS NOTES
"This gentleman has COPD with a recent exacerbation.  He is on a tapering dose of prednisone and his breathing has improved.  He is scheduled for cataract surgery in the near future    ROS    Constitutional-no night sweats weight loss headaches  GI no abdominal pain nausea or diarrhea  Neuro no seizure or neurologic deficits  Musculoskeletal no deformity or joint pain   no dysuria or hematuria  Skin no rash or other lesions  All other systems reviewed and were negative except for the above.      Physical Exam  /72 (BP Location: Right arm, Patient Position: Sitting, Cuff Size: Large Adult)   Pulse 83   Ht 190.5 cm (75\")   Wt 136 kg (300 lb)   SpO2 97%   BMI 37.50 kg/m²   Vital signs as above  Pupils equally round and reactive to light and accommodation, neck no JVD or adenopathy.  Cardiovascular regular rhythm and rate no murmur or gallop.  Abdomen soft no organomegaly tenderness.  Extremities no clubbing cyanosis or edema.  No cervical adenopathy.  No skin rash.  Neurologic good strength bilaterally without deficits  Lungs diminished breath sounds without wheeze    Impression COPD with recent exacerbation improved, patient represents acceptable candidate for cataract surgery from a pulmonary standpoint    Continue present medications, return in 3 months        This document has been produced with the assistance of Dragon dictation  This document has been electronically signed by Burak Crain MD on March 9, 2020 2:00 PM      "

## 2020-03-10 RX ORDER — METHYLPREDNISOLONE ACETATE 40 MG/ML
80 INJECTION, SUSPENSION INTRA-ARTICULAR; INTRALESIONAL; INTRAMUSCULAR; SOFT TISSUE ONCE
Status: COMPLETED | OUTPATIENT
Start: 2020-03-10 | End: 2020-03-10

## 2020-03-10 RX ADMIN — METHYLPREDNISOLONE ACETATE 80 MG: 40 INJECTION, SUSPENSION INTRA-ARTICULAR; INTRALESIONAL; INTRAMUSCULAR; SOFT TISSUE at 15:32

## 2020-06-09 ENCOUNTER — OFFICE VISIT (OUTPATIENT)
Dept: PULMONOLOGY | Facility: CLINIC | Age: 80
End: 2020-06-09

## 2020-06-09 VITALS
BODY MASS INDEX: 37.67 KG/M2 | OXYGEN SATURATION: 93 % | HEART RATE: 91 BPM | DIASTOLIC BLOOD PRESSURE: 90 MMHG | HEIGHT: 75 IN | SYSTOLIC BLOOD PRESSURE: 150 MMHG | WEIGHT: 303 LBS

## 2020-06-09 DIAGNOSIS — J44.1 CHRONIC OBSTRUCTIVE PULMONARY DISEASE WITH ACUTE EXACERBATION (HCC): Primary | ICD-10-CM

## 2020-06-09 PROCEDURE — 96372 THER/PROPH/DIAG INJ SC/IM: CPT | Performed by: INTERNAL MEDICINE

## 2020-06-09 PROCEDURE — 99214 OFFICE O/P EST MOD 30 MIN: CPT | Performed by: INTERNAL MEDICINE

## 2020-06-09 RX ORDER — METHYLPREDNISOLONE ACETATE 40 MG/ML
80 INJECTION, SUSPENSION INTRA-ARTICULAR; INTRALESIONAL; INTRAMUSCULAR; SOFT TISSUE ONCE
Status: COMPLETED | OUTPATIENT
Start: 2020-06-09 | End: 2020-06-09

## 2020-06-09 RX ADMIN — METHYLPREDNISOLONE ACETATE 80 MG: 40 INJECTION, SUSPENSION INTRA-ARTICULAR; INTRALESIONAL; INTRAMUSCULAR; SOFT TISSUE at 14:33

## 2020-06-09 NOTE — PROGRESS NOTES
"This gentleman has longstanding COPD.  He continues to have shortness of breath cough wheeze and dyspnea on exertion.  He is not producing purulent sputum and denies chest pain or hemoptysis    The following portions of the patient's history were reviewed and updated as appropriate: current medications, past family history, past medical history, past social history, past surgical history and problem list.      ROS    Constitutional-no night sweats weight loss headaches  GI no abdominal pain nausea or diarrhea  Neuro no seizure or neurologic deficits  Musculoskeletal no deformity or joint pain   no dysuria or hematuria  Skin no rash or other lesions  All other systems reviewed and were negative except for the above.      Physical Exam  /90   Pulse 91   Ht 190.5 cm (75\")   Wt (!) 137 kg (303 lb)   SpO2 93%   BMI 37.87 kg/m²   Vital signs as above  Pupils equally round and reactive to light and accommodation, neck no JVD or adenopathy.  Cardiovascular regular rhythm and rate no murmur or gallop.  Abdomen soft no organomegaly tenderness.  Extremities no clubbing cyanosis or edema.  No cervical adenopathy.  No skin rash.  Neurologic good strength bilaterally without deficits  Dyspneic white male with wheezes and rhonchi bilaterally    Impression COPD exacerbation    Plan Depo-Medrol, Trelegy inhaler, return in 3 months        This document has been produced with the assistance of Dragon dictation  This document has been electronically signed by Burak Crain MD on June 9, 2020 14:23      "

## 2020-09-11 ENCOUNTER — OFFICE VISIT (OUTPATIENT)
Dept: PULMONOLOGY | Facility: CLINIC | Age: 80
End: 2020-09-11

## 2020-09-11 VITALS
HEART RATE: 74 BPM | WEIGHT: 273.8 LBS | SYSTOLIC BLOOD PRESSURE: 130 MMHG | OXYGEN SATURATION: 97 % | BODY MASS INDEX: 34.04 KG/M2 | DIASTOLIC BLOOD PRESSURE: 68 MMHG | HEIGHT: 75 IN

## 2020-09-11 DIAGNOSIS — J44.9 OSA AND COPD OVERLAP SYNDROME (HCC): ICD-10-CM

## 2020-09-11 DIAGNOSIS — J41.8 MIXED SIMPLE AND MUCOPURULENT CHRONIC BRONCHITIS (HCC): Primary | ICD-10-CM

## 2020-09-11 DIAGNOSIS — G47.33 OSA AND COPD OVERLAP SYNDROME (HCC): ICD-10-CM

## 2020-09-11 PROCEDURE — 99213 OFFICE O/P EST LOW 20 MIN: CPT | Performed by: INTERNAL MEDICINE

## 2020-09-11 RX ORDER — BROMFENAC SODIUM 0.7 MG/ML
SOLUTION/ DROPS OPHTHALMIC
COMMUNITY
Start: 2020-07-02

## 2020-09-11 RX ORDER — TOBRAMYCIN 3 MG/ML
SOLUTION/ DROPS OPHTHALMIC
COMMUNITY
Start: 2020-07-02

## 2020-09-11 RX ORDER — PREDNISOLONE ACETATE 10 MG/ML
SUSPENSION/ DROPS OPHTHALMIC
COMMUNITY
Start: 2020-07-02

## 2020-09-11 RX ORDER — MIRABEGRON 50 MG/1
TABLET, FILM COATED, EXTENDED RELEASE ORAL
COMMUNITY
Start: 2020-07-13 | End: 2020-10-28

## 2020-09-11 RX ORDER — FEXOFENADINE HYDROCHLORIDE 60 MG/1
TABLET, FILM COATED ORAL DAILY
COMMUNITY

## 2020-09-11 RX ORDER — AMLODIPINE BESYLATE 5 MG/1
5 TABLET ORAL DAILY
COMMUNITY
Start: 2020-07-07 | End: 2021-07-08

## 2020-09-11 RX ORDER — LOSARTAN POTASSIUM 100 MG/1
100 TABLET ORAL DAILY
COMMUNITY
Start: 2020-07-07 | End: 2021-07-08

## 2020-09-11 RX ORDER — FEXOFENADINE HCL 180 MG/1
TABLET ORAL
COMMUNITY
Start: 2020-08-07 | End: 2020-10-28

## 2020-09-11 NOTE — PROGRESS NOTES
Miles Degroot is a 80 y.o. male.     Chief Complaint   Patient presents with   • COPD         History of Present Illness  -year-old male followed here every 6 months by Dr. Crain for chronic obstructive pulmonary disease he has most of the classic complaints of shortness of breath chronic cough dyspnea upon exertion he has a chronic intermittent productive cough my knowledge he is not on home oxygen.  He has history of melanoma and hypertension.  He smoked for 24 years.  And has been on trilogy now since June and says he is much improved he is also lost some weight and his breathing is much better.  Patient has a spinal stenosis and has been seen by orthopedic surgery.  His orthopedic surgeon wanted him to have surgery which I presume is they have a light stabilization of the vertebral column and the patient was refused because of his pulmonary status.  I told the patient that he probably could have surgery provided that he keeps his weight down and uses a nebulizer postoperatively.    He has history of obstructive sleep apnea he had a CPAP machine at home for about a month and a half did not like it quit using it.  Has not had CPAP since then I told him that he had to repeat his sleep study and get CPAP machine back in order to have the surgery.  That if he had surgery done he had a take CPAP machine with him to the hospital and use it postoperatively.  So I am going to get a nocturnal oximetry on him to see how severe his desaturation is and would like to have him appointed back to the sleep clinic to see if he needs to have another sleep study done in order to get another CPAP machine.  Think if they work with him on the mask that mask was his problem and he may be able to use the machine when he goes home.      The following portions of the patient's history were reviewed and updated as appropriate: allergies, current medications, past family history, past medical history, past social history, past  "surgical history and problem list.      Review of Systems   Constitutional: Negative.    HENT: Negative.    Eyes: Negative.    Respiratory: Positive for cough and shortness of breath.    Cardiovascular: Negative.    Gastrointestinal: Negative.        /68 (BP Location: Left arm, Patient Position: Sitting, Cuff Size: Adult)   Pulse 74   Ht 190.5 cm (75\")   Wt 124 kg (273 lb 12.8 oz)   SpO2 97%   BMI 34.22 kg/m²   Physical Exam   Constitutional: He appears well-developed and well-nourished.   HENT:   Head: Normocephalic.   Has a grade 3 Mallampati airway with a slightly thickened uvula and tonsils are nonenlarged   Eyes: Pupils are equal, round, and reactive to light. Conjunctivae and EOM are normal.   Neck: Normal range of motion. Neck supple. No tracheal deviation present. No thyromegaly present.   Cardiovascular: Normal rate, regular rhythm and normal heart sounds.   Pulmonary/Chest: Effort normal.   Patient has a grade 3 Mallampati airway with slightly thickened uvula and narrow lateral diameter   Abdominal: Soft. Bowel sounds are normal.         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)    -  Primary    Relevant Medications    Fluticasone-Umeclidin-Vilant (Trelegy Ellipta) 100-62.5-25 MCG/INH aerosol powder     fexofenadine (ALLEGRA) 180 MG tablet    fexofenadine (ALLEGRA) 60 MG tablet    Other Relevant Orders    Overnight Sleep Oximetry Study    Ambulatory Referral to Sleep Medicine    JUDIT and COPD overlap syndrome (CMS/HCC)        Relevant Medications    Fluticasone-Umeclidin-Vilant (Trelegy Ellipta) 100-62.5-25 MCG/INH aerosol powder     fexofenadine (ALLEGRA) 180 MG tablet    fexofenadine (ALLEGRA) 60 MG tablet    Other Relevant Orders    Overnight Sleep Oximetry Study    Ambulatory Referral to Sleep Medicine        Patient has chronic obstructive pulmonary disease he appears to be fairly clinically " stable at the present time he likes trilogy and appears to be benefiting from it Eliceo saturation today is 97% I am going to get an overnight oximetry particularly in view of the fact that he has been diagnosed with sleep apnea but is not using CPAP machine.  I think he could be cleared for surgery for spinal stenosis provided he gets back on his CPAP machine and uses it postoperatively and that we check his oxygenation before the surgery.

## 2020-10-28 PROBLEM — R42 DIZZY SPELLS: Status: ACTIVE | Noted: 2020-10-28

## 2020-10-28 PROBLEM — H91.90 HEARING LOSS: Status: ACTIVE | Noted: 2020-10-28

## 2020-10-28 PROBLEM — G47.09 OTHER INSOMNIA: Status: ACTIVE | Noted: 2018-01-24

## 2020-10-28 PROBLEM — M16.9 OSTEOARTHROSIS, HIP: Status: ACTIVE | Noted: 2019-04-10

## 2020-10-28 PROBLEM — T78.40XA ALLERGY: Status: ACTIVE | Noted: 2020-10-28

## 2020-10-28 PROBLEM — J30.2 SEASONAL ALLERGIES: Status: ACTIVE | Noted: 2019-04-18

## 2020-10-28 PROBLEM — I10 HYPERTENSIVE DISORDER: Status: ACTIVE | Noted: 2019-04-18

## 2020-10-28 PROBLEM — M17.0 PRIMARY OSTEOARTHRITIS OF BOTH KNEES: Status: ACTIVE | Noted: 2019-04-10

## 2020-10-28 PROBLEM — R73.03 PREDIABETES: Status: ACTIVE | Noted: 2020-10-06

## 2020-10-28 PROBLEM — E78.00 HYPERCHOLESTEROLEMIA: Status: ACTIVE | Noted: 2018-07-24

## 2020-10-28 PROBLEM — J41.0 SIMPLE CHRONIC BRONCHITIS: Status: ACTIVE | Noted: 2020-02-18

## 2020-10-28 PROBLEM — K21.9 GASTROESOPHAGEAL REFLUX DISEASE WITHOUT ESOPHAGITIS: Status: ACTIVE | Noted: 2018-01-24

## 2020-10-28 PROBLEM — M54.16 LEFT LUMBAR RADICULOPATHY: Status: ACTIVE | Noted: 2020-01-24

## 2020-10-28 PROBLEM — H93.19 TINNITUS: Status: ACTIVE | Noted: 2020-10-28

## 2020-11-10 ENCOUNTER — OFFICE VISIT (OUTPATIENT)
Dept: PODIATRY | Facility: CLINIC | Age: 80
End: 2020-11-10

## 2020-11-10 VITALS — WEIGHT: 254 LBS | OXYGEN SATURATION: 98 % | HEART RATE: 76 BPM | HEIGHT: 73 IN | BODY MASS INDEX: 33.66 KG/M2

## 2020-11-10 DIAGNOSIS — M79.674 PAIN OF TOE OF RIGHT FOOT: ICD-10-CM

## 2020-11-10 DIAGNOSIS — B35.1 ONYCHOMYCOSIS: ICD-10-CM

## 2020-11-10 DIAGNOSIS — L03.031 PARONYCHIA OF TOE OF RIGHT FOOT: Primary | ICD-10-CM

## 2020-11-10 PROCEDURE — 99213 OFFICE O/P EST LOW 20 MIN: CPT | Performed by: PODIATRIST

## 2020-11-10 NOTE — PROGRESS NOTES
Miles Degroot  1940  80 y.o. male     Patient presents with right great toenail issues, pain    11/10/2020    Chief Complaint   Patient presents with   • Left Foot - Nail Problem     Big toe           History of Present Illness    Mr Degroot is a 80 y.o. male who presents for evaluation of left great toenail infection.  He states he noticed some bleeding and purulent drainage from the edge of his nail.  He was seen in urgent care was drained and he was started on doxycycline.  He is also been soaking in warm soapy water.  He feels that his discomfort has significantly improved.    Past Medical History:   Diagnosis Date   • Bleeding tendency (CMS/HCC)    • Emphysema of lung (CMS/HCC)    • High blood pressure    • Prostate cancer (CMS/HCC)          Past Surgical History:   Procedure Laterality Date   • EYE SURGERY     • TOTAL KNEE ARTHROPLASTY           Family History   Problem Relation Age of Onset   • Cancer Other    • Diabetes Other    • Heart disease Other          Social History     Socioeconomic History   • Marital status:      Spouse name: Not on file   • Number of children: Not on file   • Years of education: Not on file   • Highest education level: Not on file   Tobacco Use   • Smoking status: Former Smoker     Quit date: 1982     Years since quittin.8   • Smokeless tobacco: Never Used   Substance and Sexual Activity   • Alcohol use: Yes     Comment: shot whiskey 2-3 nights/ week   • Drug use: Never   • Sexual activity: Defer         Current Outpatient Medications   Medication Sig Dispense Refill   • albuterol sulfate  (90 Base) MCG/ACT inhaler      • amLODIPine (NORVASC) 5 MG tablet Take 5 mg by mouth Daily.     • aspirin 81 MG chewable tablet Chew 81 mg Daily.     • Chromium-Cinnamon 200-1000 MCG-MG capsule Take  by mouth.     • Cinnamon 500 MG capsule Take 500 mg by mouth 2 (Two) Times a Day.     • diazePAM (VALIUM) 10 MG tablet as needed.     • docusate sodium (COLACE) 100 MG  "capsule Take 100 mg by mouth.     • fexofenadine (ALLEGRA) 60 MG tablet Take  by mouth Daily.     • Fluticasone-Umeclidin-Vilant (Trelegy Ellipta) 100-62.5-25 MCG/INH aerosol powder       • Fluticasone-Umeclidin-Vilant 100-62.5-25 MCG/INH aerosol powder  Inhale 1 puff Daily. 1 each 5   • Ibuprofen 200 MG capsule Take 200 mg by mouth.     • losartan (COZAAR) 100 MG tablet Take 100 mg by mouth Daily.     • meclizine (ANTIVERT) 25 MG tablet      • montelukast (SINGULAIR) 10 MG tablet      • Omega-3 Fatty Acids (FISH OIL) 1000 MG capsule capsule Take 1,000 mg by mouth 2 (Two) Times a Day.     • prednisoLONE acetate (PRED FORTE) 1 % ophthalmic suspension      • PROLENSA 0.07 % solution      • tamsulosin (FLOMAX) 0.4 MG capsule 24 hr capsule Take 0.4 mg by mouth Daily.     • tobramycin 0.3 % solution ophthalmic solution      • TURMERIC PO Take 1 capsule by mouth 2 (Two) Times a Day.       No current facility-administered medications for this visit.          OBJECTIVE    Pulse 76   Ht 185.4 cm (73\")   Wt 115 kg (254 lb)   SpO2 98%   BMI 33.51 kg/m²       Review of Systems   Constitutional:  Denies recent weight loss, weight gain, fever or chills, no change in exercise tolerance  Musculoskeletal: Foot pain.   Skin:  Callus  Neurological: Denies paresthesias.  Psychiatric/Behavioral: Denies depression      Physical Exam   Constitutional: he appears well-developed and well-nourished.   HEENT: Normocephalic. Atraumatic  CV: No tenderness. RRR  Resp: Non-labored respiration. No wheezes.   Psychiatric: he has a normal mood and affect. his   behavior is normal.      Lower Extremity Exam:  Vascular: DP/PT pulses palpable 1+.   Negative hair growth.   Minimal perimalleolar edema  CFT wnl  Neuro: Protective sensation intact, b/l.  DTRs intact  Integument: No open wounds.  Mild onychomycosis to bilateral great toes with incurvated medial lateral nail edges.  This does protrude the nail bed.  No open wounds.  No erythema.  No " tenderness palpation.  Medial nail border of right hallux with desiccated bulla tissue and bloody drainage.  No erythema.  No tenderness palpation.  Neuro acute signs of infection.  Web spaces c/d/i  Musculoskeletal: LE muscle strength 5/5.   Gait normal  Semi-rigid hammertoe deformity toes 2-5 b/l.  Nails 1-5 b/l thickened  Ankle ROM decreased              ASSESSMENT AND PLAN    Diagnoses and all orders for this visit:    1. Paronychia of toe of right foot (Primary)    2. Onychomycosis    3. Pain of toe of right foot      -Comprehensive foot and ankle exam performed  -Patient with resolving paronychia secondary to mild ingrowing nail.  The medial nail border was debrided with a nail nipper and dermal curette.  -Advised to complete his doxycycline course and continue soapy water soaks over the next week.  No obvious indication for need of nail avulsion procedure today.  -Recheck as needed, if symptoms recur          This document has been electronically signed by Willis Jennings DPM on November 11, 2020 13:15 CST         Willis Jennings DPM  11/11/2020  13:15 CST

## 2020-11-12 ENCOUNTER — TRANSCRIBE ORDERS (OUTPATIENT)
Dept: PULMONOLOGY | Facility: HOSPITAL | Age: 80
End: 2020-11-12

## 2020-11-12 DIAGNOSIS — J42 CHRONIC BRONCHITIS, UNSPECIFIED CHRONIC BRONCHITIS TYPE (HCC): Primary | ICD-10-CM

## 2020-11-17 ENCOUNTER — TRANSCRIBE ORDERS (OUTPATIENT)
Dept: PULMONOLOGY | Facility: HOSPITAL | Age: 80
End: 2020-11-17

## 2020-11-17 DIAGNOSIS — J44.9 OBSTRUCTIVE CHRONIC BRONCHITIS WITHOUT EXACERBATION (HCC): Primary | ICD-10-CM

## 2020-11-20 ENCOUNTER — HOSPITAL ENCOUNTER (OUTPATIENT)
Dept: PULMONOLOGY | Facility: HOSPITAL | Age: 80
Discharge: HOME | End: 2020-11-20
Admitting: CHIROPRACTOR

## 2020-11-20 DIAGNOSIS — J44.9 OBSTRUCTIVE CHRONIC BRONCHITIS WITHOUT EXACERBATION (HCC): ICD-10-CM

## 2020-11-20 PROCEDURE — 94727 GAS DIL/WSHOT DETER LNG VOL: CPT

## 2020-11-20 PROCEDURE — 94010 BREATHING CAPACITY TEST: CPT | Performed by: INTERNAL MEDICINE

## 2020-11-20 PROCEDURE — 94729 DIFFUSING CAPACITY: CPT

## 2020-11-20 PROCEDURE — 94727 GAS DIL/WSHOT DETER LNG VOL: CPT | Performed by: INTERNAL MEDICINE

## 2020-11-20 PROCEDURE — 94729 DIFFUSING CAPACITY: CPT | Performed by: INTERNAL MEDICINE

## 2020-11-20 PROCEDURE — 94010 BREATHING CAPACITY TEST: CPT

## 2021-03-11 ENCOUNTER — IMMUNIZATION (OUTPATIENT)
Dept: VACCINE CLINIC | Facility: HOSPITAL | Age: 81
End: 2021-03-11

## 2021-03-11 PROCEDURE — 91300 HC SARSCOV02 VAC 30MCG/0.3ML IM: CPT | Performed by: THORACIC SURGERY (CARDIOTHORACIC VASCULAR SURGERY)

## 2021-03-11 PROCEDURE — 0001A: CPT | Performed by: THORACIC SURGERY (CARDIOTHORACIC VASCULAR SURGERY)

## 2021-04-01 ENCOUNTER — APPOINTMENT (OUTPATIENT)
Dept: VACCINE CLINIC | Facility: HOSPITAL | Age: 81
End: 2021-04-01

## 2021-04-02 ENCOUNTER — IMMUNIZATION (OUTPATIENT)
Dept: VACCINE CLINIC | Facility: HOSPITAL | Age: 81
End: 2021-04-02

## 2021-04-02 PROCEDURE — 91300 HC SARSCOV02 VAC 30MCG/0.3ML IM: CPT | Performed by: THORACIC SURGERY (CARDIOTHORACIC VASCULAR SURGERY)

## 2021-04-02 PROCEDURE — 0002A: CPT | Performed by: THORACIC SURGERY (CARDIOTHORACIC VASCULAR SURGERY)

## 2021-06-02 NOTE — MR AVS SNAPSHOT
"                        Miles Degroot   2017 3:15 PM   Office Visit    Dept Phone:  [phone]   Encounter #:  [id number]    Provider:  Willis Jennings DPM   Department:  [facility]                Your Full Care Plan              Your Updated Medication List          This list is accurate as of: 17  4:01 PM.  Always use your most recent med list.                lisinopril-hydrochlorothiazide 20-12.5 MG per tablet   Commonly known as:  PRINZIDE,ZESTORETIC       meclizine 25 MG tablet   Commonly known as:  ANTIVERT               Instructions     None    Patient Instructions History      Upcoming Appointments     Visit Type Date Time Department    NEW PATIENT 2017  3:15 PM [facility]      [employer] Signup     [facility] [employer] allows you to send messages to your doctor, view your test results, renew your prescriptions, schedule appointments, and more. To sign up, go to [email or web address] and click on the Sign Up Now link in the New User? box. Enter your [employer] Activation Code exactly as it appears below along with the last four digits of your Social Security Number and your Date of Birth () to complete the sign-up process. If you do not sign up before the expiration date, you must request a new code.    [employer] Activation Code: [email or web address]  Expires: 2017  4:01 PM    If you have questions, you can email [email or web address] or call [phone] to talk to our [employer] staff. Remember, [employer] is NOT to be used for urgent needs. For medical emergencies, dial 911.               Other Info from Your Visit           Allergies     Contrast Dye      IVP DYE      Reason for Visit     Left Foot - Wound Check           Vital Signs     Blood Pressure Pulse Height Weight Oxygen Saturation Body Mass Index    127/70 73 74\" (188 cm) 294 lb (133 kg) 93% 37.75 kg/m2        " Normal

## 2023-04-12 ENCOUNTER — OFFICE VISIT (OUTPATIENT)
Dept: SLEEP MEDICINE | Facility: HOSPITAL | Age: 83
End: 2023-04-12
Payer: MEDICARE

## 2023-04-12 VITALS
WEIGHT: 292 LBS | SYSTOLIC BLOOD PRESSURE: 118 MMHG | BODY MASS INDEX: 38.7 KG/M2 | HEART RATE: 74 BPM | DIASTOLIC BLOOD PRESSURE: 68 MMHG | HEIGHT: 73 IN | OXYGEN SATURATION: 94 %

## 2023-04-12 DIAGNOSIS — J60 COAL WORKERS PNEUMOCONIOSIS: ICD-10-CM

## 2023-04-12 DIAGNOSIS — J44.9 MODERATE COPD (CHRONIC OBSTRUCTIVE PULMONARY DISEASE): ICD-10-CM

## 2023-04-12 DIAGNOSIS — G47.00 FREQUENT NOCTURNAL AWAKENING: ICD-10-CM

## 2023-04-12 DIAGNOSIS — G25.81 RESTLESS LEGS SYNDROME: ICD-10-CM

## 2023-04-12 DIAGNOSIS — I48.91 ATRIAL FIBRILLATION, UNSPECIFIED TYPE: ICD-10-CM

## 2023-04-12 DIAGNOSIS — F51.04 PSYCHOPHYSIOLOGICAL INSOMNIA: ICD-10-CM

## 2023-04-12 DIAGNOSIS — R06.81 WITNESSED EPISODE OF APNEA: ICD-10-CM

## 2023-04-12 DIAGNOSIS — G47.19 EXCESSIVE DAYTIME SLEEPINESS: ICD-10-CM

## 2023-04-12 DIAGNOSIS — R06.83 SNORING: Primary | ICD-10-CM

## 2023-04-12 PROCEDURE — 99213 OFFICE O/P EST LOW 20 MIN: CPT | Performed by: NURSE PRACTITIONER

## 2023-04-12 PROCEDURE — 3074F SYST BP LT 130 MM HG: CPT | Performed by: NURSE PRACTITIONER

## 2023-04-12 PROCEDURE — 1160F RVW MEDS BY RX/DR IN RCRD: CPT | Performed by: NURSE PRACTITIONER

## 2023-04-12 PROCEDURE — 1159F MED LIST DOCD IN RCRD: CPT | Performed by: NURSE PRACTITIONER

## 2023-04-12 PROCEDURE — 3078F DIAST BP <80 MM HG: CPT | Performed by: NURSE PRACTITIONER

## 2023-04-12 RX ORDER — FUROSEMIDE 40 MG/1
1 TABLET ORAL DAILY
COMMUNITY
Start: 2023-03-11

## 2023-04-12 RX ORDER — APIXABAN 5 MG/1
TABLET, FILM COATED ORAL
COMMUNITY
Start: 2023-04-05

## 2023-04-12 RX ORDER — ATORVASTATIN CALCIUM 80 MG/1
80 TABLET, FILM COATED ORAL
COMMUNITY
Start: 2023-03-11

## 2023-04-12 RX ORDER — CETIRIZINE HYDROCHLORIDE 10 MG/1
10 TABLET, CHEWABLE ORAL DAILY
COMMUNITY

## 2023-04-12 RX ORDER — MIRABEGRON 50 MG/1
1 TABLET, FILM COATED, EXTENDED RELEASE ORAL DAILY
COMMUNITY
Start: 2023-02-21

## 2023-04-12 RX ORDER — AMLODIPINE BESYLATE 5 MG/1
TABLET ORAL
COMMUNITY
Start: 2023-02-23

## 2023-04-12 RX ORDER — POTASSIUM CHLORIDE 750 MG/1
10 TABLET, FILM COATED, EXTENDED RELEASE ORAL DAILY
COMMUNITY
Start: 2023-03-23

## 2023-04-12 RX ORDER — ROPINIROLE 1 MG/1
TABLET, FILM COATED ORAL
COMMUNITY
Start: 2023-03-21

## 2023-04-12 RX ORDER — ACETAMINOPHEN 500 MG
500 TABLET ORAL
COMMUNITY

## 2023-04-12 RX ORDER — FLUTICASONE PROPIONATE 50 MCG
1 SPRAY, SUSPENSION (ML) NASAL
COMMUNITY

## 2023-04-12 RX ORDER — CEFUROXIME AXETIL 500 MG/1
500 TABLET ORAL
COMMUNITY
Start: 2023-04-12 | End: 2023-04-23

## 2023-04-12 NOTE — PROGRESS NOTES
New Patient Sleep Medicine Consultation    Encounter Date: 4/12/2023         Patient's Primary Care Provider: Bill Gasca MD  Referring Provider: No ref. provider found   Reason for consultation/chief complaint: snoring, witnessed apneas, excessive daytime sleepiness, unrefreshing sleep, insomnia and recent hospitalization with new onset atrial fibrillation and CHF    Miles Degroot is a 83 y.o. male who admits to snoring, unrestful sleep, high blood pressure, excessive daytime sleepiness, morning headaches, stopping breathing during sleep, frequent unexplained arousals, disturbed or restless sleep, up to bathroom at night, restless legs at night, difficulty falling asleep and difficulty staying asleep.    He denies cataplexy, sleep paralysis, or hypnagogic hallucinations. His bedtime is ~ 2300. He  falls asleep after 5-20 minutes, and is up 4-5 times per night. He wakes up ~ 0800. He endorses 6 hours of sleep. He drinks 1 cups of coffee, 0 teas, and 1 sodas per day. He drinks 0 alcoholic beverages per week. He is not a current smoker. He does not take sedatives or hypnotics. He has no sleepiness with driving. He naps every day if unstimulated.     Patient was recently hospitalized last month and diagnosed with new onset atrial fibrillation requiring cardioversion/ablation as well as new onset CHF. Echocardiogram is not on file but will obtain records for most recent EF. *Addendum 04/12/23 1351: echo on 03/07/23, Ef=50-55%.    PFTs in 2020 showed moderate obstruction. Patient has history of coal worker's pneumoconiosis as well.    Gordon - 15  Gordon Sleepiness Scale  Sitting and reading: High chance of dozing  Watching TV: High chance of dozing  Sitting, inactive in a public place (e.g. a theatre or a meeting): High chance of dozing  As a passenger in a car for an hour without a break: High chance of dozing  Lying down to rest in the afternoon when circumstances permit: High chance of dozing  Sitting and  talking to someone: Would never doze  Sitting quietly after a lunch without alcohol: Would never doze  In a car, while stopped for a few minutes in traffic: Would never doze  Total score: 15    Prior Sleep Testin. PSG ~9 years ago, AHI of ? (reportedly severe)    Past Medical History:   Diagnosis Date   • Bleeding tendency    • Emphysema of lung    • High blood pressure    • Prostate cancer      Social History     Socioeconomic History   • Marital status:    Tobacco Use   • Smoking status: Former     Types: Cigarettes     Quit date: 1982     Years since quittin.3   • Smokeless tobacco: Never   Substance and Sexual Activity   • Alcohol use: Yes     Comment: shot whiskey 2-3 nights/ week   • Drug use: Never   • Sexual activity: Defer     Family History   Problem Relation Age of Onset   • Cancer Other    • Diabetes Other    • Heart disease Other      Prior T&A, UPPP, maxillofacial, or bariatric surgery: Tonsillectomy  Family history of sleep disorders: Son-in-law  Other family history + for: As above  Occupation: Retired from coal mines  Marital status:   Children: 2  Has 1 brothers and 1 sisters  Smoking history: smoked 0.5 ppds from age 19 until 42      Review of Systems:  Constitutional: positive for fatigue  Ears, nose, mouth, throat, and face: positive for snoring  Respiratory: positive for chronic bronchitis, cough and dyspnea on exertion  Cardiovascular: positive for dyspnea, fatigue and irregular heart beat  Gastrointestinal: negative  Genitourinary:negative  Musculoskeletal:negative  Neurological: negative  Behavioral/Psych: positive for fatigue and sleep disturbance  Patient advised to discuss any positive ROS with PCP.      Vitals:    23 1105   BP: 118/68   Pulse: 74   SpO2: 94%           23  1105   Weight: 132 kg (292 lb)       Body mass index is 38.53 kg/m². Class 2 Severe Obesity (BMI >=35 and <=39.9). Obesity-related health conditions include the following:  "obstructive sleep apnea, hypertension, dyslipidemias and GERD. Obesity is unchanged. BMI is is above average; BMI management plan is completed. I recommend portion control and increasing exercise.    Tobacco Use: Medium Risk   • Smoking Tobacco Use: Former   • Smokeless Tobacco Use: Never   • Passive Exposure: Not on file       Physical Exam:        General: Alert. Cooperative. Well developed. No acute distress.   Head/Neck:  Normocephalic. Symmetrical. Atraumatic.     Neck circumference: 19\"             Eyes: Sclera clear. No icterus. PERRLA. Normal EOM.             Ears: No deformities. Normal hearing.             Nose: No septal deviation. No drainage.          Throat: No oral lesions. No thrush. Moist mucous membranes. Trachea midline    Tongue is normal     Dentition is upper and lower dentures       Pharynx: Posterior pharyngeal pillars are narrow    Mallampati score of III (soft and hard palate and base of uvula visible)    Pharynx is nonerythematous, with both tonsils absent   Chest Wall:  Normal shape. Symmetric expansion with respiration. No tenderness.          Lungs:  Clear to auscultation bilaterally. No wheezes. No rhonchi. No rales. Respirations regular, even and unlabored.            Heart:  Regular rhythm and normal rate. Normal S1 and S2. No murmur.     Abdomen:  Soft, non-tender and non-distended. Normal bowel sounds. No masses.  Extremities:  Moves all extremities well. No edema.           Pulses: Pulses palpable and equal bilaterally.               Skin: Dry. Intact. No bleeding. No rash.           Neuro: Moves all 4 extremities and cranial nerves grossly intact.  Psychiatric: Normal mood and affect.      Current Outpatient Medications:   •  albuterol sulfate  (90 Base) MCG/ACT inhaler, , Disp: , Rfl:   •  cefuroxime (CEFTIN) 500 MG tablet, Take 1 tablet by mouth., Disp: , Rfl:   •  Chromium-Cinnamon 200-1000 MCG-MG capsule, Take  by mouth., Disp: , Rfl:   •  Cinnamon 500 MG capsule, Take " 1 capsule by mouth 2 (Two) Times a Day., Disp: , Rfl:   •  diazePAM (VALIUM) 10 MG tablet, as needed., Disp: , Rfl:   •  docusate sodium (COLACE) 100 MG capsule, Take 1 capsule by mouth., Disp: , Rfl:   •  fexofenadine (ALLEGRA) 60 MG tablet, Take  by mouth Daily., Disp: , Rfl:   •  Fluticasone-Umeclidin-Vilant (TRELEGY) 100-62.5-25 MCG/INH inhaler, , Disp: , Rfl:   •  Fluticasone-Umeclidin-Vilant 100-62.5-25 MCG/INH aerosol powder , Inhale 1 puff Daily., Disp: 1 each, Rfl: 5  •  meclizine (ANTIVERT) 25 MG tablet, , Disp: , Rfl:   •  prednisoLONE acetate (PRED FORTE) 1 % ophthalmic suspension, , Disp: , Rfl:   •  PROLENSA 0.07 % solution, , Disp: , Rfl:   •  tamsulosin (FLOMAX) 0.4 MG capsule 24 hr capsule, Take 1 capsule by mouth Daily., Disp: , Rfl:   •  tobramycin 0.3 % solution ophthalmic solution, , Disp: , Rfl:   •  TURMERIC PO, Take 1 capsule by mouth 2 (Two) Times a Day., Disp: , Rfl:   •  acetaminophen (TYLENOL) 500 MG tablet, Take 1 tablet by mouth., Disp: , Rfl:   •  amLODIPine (NORVASC) 5 MG tablet, TAKE ONE (1) TABLET BY MOUTH EACH EVENING, Disp: , Rfl:   •  aspirin 81 MG chewable tablet, Chew 81 mg Daily. (Patient not taking: Reported on 4/12/2023), Disp: , Rfl:   •  atorvastatin (LIPITOR) 80 MG tablet, Take 1 tablet by mouth every night at bedtime., Disp: , Rfl:   •  cetirizine (ZyrTEC) 10 MG chewable tablet, Chew 1 tablet Daily., Disp: , Rfl:   •  Eliquis 5 MG tablet tablet, TAKE ONE (1) TABLET BY MOUTH TWICE A DAY, Disp: , Rfl:   •  fluticasone (FLONASE) 50 MCG/ACT nasal spray, 1 spray into the nostril(s) as directed by provider., Disp: , Rfl:   •  furosemide (LASIX) 40 MG tablet, Take 1 tablet by mouth Daily., Disp: , Rfl:   •  Ibuprofen 200 MG capsule, Take 200 mg by mouth. (Patient not taking: Reported on 4/12/2023), Disp: , Rfl:   •  metoprolol tartrate (LOPRESSOR) 25 MG tablet, , Disp: , Rfl:   •  montelukast (SINGULAIR) 10 MG tablet, , Disp: , Rfl:   •  Myrbetriq 50 MG tablet sustained-release  24 hour 24 hr tablet, Take 50 mg by mouth Daily., Disp: , Rfl:   •  Omega-3 Fatty Acids (FISH OIL) 1000 MG capsule capsule, Take 1,000 mg by mouth 2 (Two) Times a Day. (Patient not taking: Reported on 4/12/2023), Disp: , Rfl:   •  potassium chloride 10 MEQ CR tablet, Take 1 tablet by mouth Daily., Disp: , Rfl:   •  rOPINIRole (REQUIP) 1 MG tablet, TAKE ONE (1) TABLET BY MOUTH EACH EVENING, Disp: , Rfl:     WBC   Date Value Ref Range Status   03/02/2020 15.58 (H) 3.40 - 10.80 10*3/mm3 Final   10/10/2019 7.6 4.0 - 11.0 10*3/uL Final     RBC   Date Value Ref Range Status   03/02/2020 3.64 (L) 4.14 - 5.80 10*6/mm3 Final   10/10/2019 4.51 (L) 4.73 - 5.49 10*6/uL Final     Hemoglobin   Date Value Ref Range Status   03/02/2020 10.6 (L) 13.0 - 17.7 g/dL Final   10/10/2019 12.7 (L) 14.4 - 16.6 g/dL Final     Hematocrit   Date Value Ref Range Status   03/02/2020 32.3 (L) 37.5 - 51.0 % Final   10/10/2019 39.8 (L) 42.9 - 49.1 % Final     MCV   Date Value Ref Range Status   03/02/2020 88.7 79.0 - 97.0 fL Final   10/10/2019 88 85 - 95 fL Final     MCH   Date Value Ref Range Status   03/02/2020 29.1 26.6 - 33.0 pg Final   10/10/2019 28.2 28.0 - 32.0 pg Final     MCHC   Date Value Ref Range Status   03/02/2020 32.8 31.5 - 35.7 g/dL Final   10/10/2019 31.9 (L) 32.0 - 34.0 g/dL Final     RDW   Date Value Ref Range Status   03/02/2020 14.2 12.3 - 15.4 % Final   10/10/2019 46.1 37 - 54 fL Final     RDW-SD   Date Value Ref Range Status   03/02/2020 45.8 37.0 - 54.0 fl Final     MPV   Date Value Ref Range Status   03/02/2020 8.7 6.0 - 12.0 fL Final   10/10/2019 9.7 8.0 - 12.0 fL Final     Platelets   Date Value Ref Range Status   03/02/2020 372 140 - 450 10*3/mm3 Final   10/10/2019 285 150 - 450 10*3/uL Final     Neutrophil %   Date Value Ref Range Status   03/02/2020 82.4 (H) 42.7 - 76.0 % Final     Lymphocyte Rel %   Date Value Ref Range Status   10/10/2019 23.3 5 - 55 % Final     Lymphocyte %   Date Value Ref Range Status   03/02/2020  8.7 (L) 19.6 - 45.3 % Final     Monocyte Rel %   Date Value Ref Range Status   10/10/2019 9 (H) 3 - 8 % Final     Monocyte %   Date Value Ref Range Status   03/02/2020 6.5 5.0 - 12.0 % Final     Eosinophil Rel %   Date Value Ref Range Status   10/10/2019 3 0 - 5 % Final     Eosinophil %   Date Value Ref Range Status   03/02/2020 0.1 (L) 0.3 - 6.2 % Final     Basophil Rel %   Date Value Ref Range Status   10/10/2019 1 0 - 2 % Final     Basophil %   Date Value Ref Range Status   03/02/2020 0.3 0.0 - 1.5 % Final     Immature Grans %   Date Value Ref Range Status   03/02/2020 2.0 (H) 0.0 - 0.5 % Final   10/10/2019 63.3 45 - 80 % Final     Neutrophils, Absolute   Date Value Ref Range Status   03/02/2020 12.83 (H) 1.70 - 7.00 10*3/mm3 Final     Lymphocytes, Absolute   Date Value Ref Range Status   03/02/2020 1.36 0.70 - 3.10 10*3/mm3 Final     Monocytes, Absolute   Date Value Ref Range Status   03/02/2020 1.01 (H) 0.10 - 0.90 10*3/mm3 Final     Eosinophils, Absolute   Date Value Ref Range Status   03/02/2020 0.02 0.00 - 0.40 10*3/mm3 Final     Basophils, Absolute   Date Value Ref Range Status   03/02/2020 0.05 0.00 - 0.20 10*3/mm3 Final     Immature Grans, Absolute   Date Value Ref Range Status   03/02/2020 0.31 (H) 0.00 - 0.05 10*3/mm3 Final     nRBC   Date Value Ref Range Status   03/02/2020 0.1 0.0 - 0.2 /100 WBC Final     Lab Results   Component Value Date    GLUCOSE 143 (H) 03/02/2020    BUN 19 03/21/2023    CREATININE 1.3 03/21/2023    BCR 21.9 03/02/2020    K 3.3 (L) 03/21/2023    CO2 29 03/21/2023    CALCIUM 8.7 03/21/2023    ALBUMIN 4.0 03/21/2023    BILITOT 0.50 07/26/2022    AST 16 07/26/2022    ALT 27 07/26/2022       Contraindications to home sleep test: Moderate or severe COPD, Moderate or severe congestive heart failure, please note class _ _, Sleep related movement disorder like periodic limb movement disorder and Patient has a history of obstructive sleep apnea, and uncontrolled on current  settings    ASSESSMENT:  1. Excessive daytime sleepiness, presumed obstructive sleep apnea - New (to me), additional work-up planned (4)  1. Check split night PSG   2. Obtaining records for most recent echocardiogram last month  3. Follow up for results  4. Pertinent labs were reviewed as listed above  2. Snoring, presumed obstructive sleep apnea - New (to me), additional work-up planned (4)  1. As above  3. Frequent nocturnal awakenings - New (to me), additional work-up planned (4)  1. As above  4. Restless leg syndrome/ Periodic limb movement disorder - (RLS/PLMD) - New (to me), additional work-up planned (4)  1. Address after further testing  2. Consider iron/ferritin levels  3. Continue Requip for now  5. Insomnia - sleep onset/maintenance - New (to me), additional work-up planned (4)    1.   As above  6. Morbid obesity - BMI 38.5 - stable chronic illness  7. CHF, atrial fibrillation - New (to me), no additional work-up planned (3)  8. Coal worker's pneumoconiosis, moderate COPD - New (to me), no additional work-up planned (3)      I spent 35 minutes caring for Miles on this date of service. This time includes time spent by me in the following activities: preparing for the visit, reviewing tests, obtaining and/or reviewing a separately obtained history, performing a medically appropriate examination and/or evaluation , counseling and educating the patient/family/caregiver, ordering medications, tests, or procedures, documenting information in the medical record and care coordination; discussing PAP therapy and Further testing    RTC 2 weeks after testing. Patient agrees to return sooner if changes in symptoms.         This document has been electronically signed by JU Bailey on April 12, 2023 11:14 CDT          CC: Bill Gasca MD          No ref. provider found

## 2023-05-11 PROCEDURE — 87635 SARS-COV-2 COVID-19 AMP PRB: CPT | Performed by: NURSE PRACTITIONER

## 2023-08-02 ENCOUNTER — HOSPITAL ENCOUNTER (OUTPATIENT)
Age: 83
Setting detail: SPECIMEN
Discharge: HOME | End: 2023-08-02
Payer: MEDICARE

## 2023-08-02 PROCEDURE — 88305 TISSUE EXAM BY PATHOLOGIST: CPT

## 2023-09-20 ENCOUNTER — HOSPITAL ENCOUNTER (OUTPATIENT)
Dept: SLEEP MEDICINE | Facility: HOSPITAL | Age: 83
Discharge: HOME | End: 2023-09-20
Payer: MEDICARE

## 2023-09-20 VITALS — BODY MASS INDEX: 38.7 KG/M2 | WEIGHT: 292 LBS | HEIGHT: 73 IN

## 2023-09-20 DIAGNOSIS — R06.81 WITNESSED EPISODE OF APNEA: ICD-10-CM

## 2023-09-20 DIAGNOSIS — G25.81 RESTLESS LEGS SYNDROME: ICD-10-CM

## 2023-09-20 DIAGNOSIS — J60 COAL WORKERS PNEUMOCONIOSIS: ICD-10-CM

## 2023-09-20 DIAGNOSIS — J44.9 MODERATE COPD (CHRONIC OBSTRUCTIVE PULMONARY DISEASE): ICD-10-CM

## 2023-09-20 DIAGNOSIS — G47.19 EXCESSIVE DAYTIME SLEEPINESS: ICD-10-CM

## 2023-09-20 DIAGNOSIS — R06.83 SNORING: ICD-10-CM

## 2023-09-20 DIAGNOSIS — I48.91 ATRIAL FIBRILLATION, UNSPECIFIED TYPE: ICD-10-CM

## 2023-09-20 DIAGNOSIS — G47.00 FREQUENT NOCTURNAL AWAKENING: ICD-10-CM

## 2023-09-20 DIAGNOSIS — F51.04 PSYCHOPHYSIOLOGICAL INSOMNIA: ICD-10-CM

## 2023-09-20 PROCEDURE — 95811 POLYSOM 6/>YRS CPAP 4/> PARM: CPT

## 2024-05-20 ENCOUNTER — HOSPITAL ENCOUNTER (OUTPATIENT)
Age: 84
Setting detail: SPECIMEN
Discharge: HOME | End: 2024-05-20

## 2024-10-15 PROCEDURE — 88305 TISSUE EXAM BY PATHOLOGIST: CPT | Performed by: OTOLARYNGOLOGY

## 2024-10-22 ENCOUNTER — LAB REQUISITION (OUTPATIENT)
Dept: LAB | Facility: HOSPITAL | Age: 84
End: 2024-10-22
Payer: MEDICARE

## 2024-10-22 DIAGNOSIS — D48.5 NEOPLASM OF UNCERTAIN BEHAVIOR OF SKIN: ICD-10-CM

## 2024-10-24 LAB
CYTO UR: NORMAL
LAB AP CASE REPORT: NORMAL
LAB AP CLINICAL INFORMATION: NORMAL
Lab: NORMAL
PATH REPORT.FINAL DX SPEC: NORMAL
PATH REPORT.GROSS SPEC: NORMAL

## 2024-12-03 PROCEDURE — 88304 TISSUE EXAM BY PATHOLOGIST: CPT | Performed by: OTOLARYNGOLOGY

## 2024-12-05 ENCOUNTER — LAB REQUISITION (OUTPATIENT)
Dept: LAB | Facility: HOSPITAL | Age: 84
End: 2024-12-05
Payer: MEDICARE

## 2024-12-05 DIAGNOSIS — D48.5 NEOPLASM OF UNCERTAIN BEHAVIOR OF SKIN: ICD-10-CM

## 2025-03-20 PROCEDURE — 88305 TISSUE EXAM BY PATHOLOGIST: CPT | Performed by: OTOLARYNGOLOGY

## 2025-03-24 ENCOUNTER — LAB REQUISITION (OUTPATIENT)
Dept: LAB | Facility: HOSPITAL | Age: 85
End: 2025-03-24
Payer: MEDICARE

## 2025-03-24 DIAGNOSIS — D48.5 NEOPLASM OF UNCERTAIN BEHAVIOR OF SKIN: ICD-10-CM

## 2025-08-08 PROCEDURE — 88305 TISSUE EXAM BY PATHOLOGIST: CPT | Performed by: OTOLARYNGOLOGY

## 2025-08-08 PROCEDURE — 88341 IMHCHEM/IMCYTCHM EA ADD ANTB: CPT | Performed by: OTOLARYNGOLOGY

## 2025-08-11 ENCOUNTER — LAB REQUISITION (OUTPATIENT)
Dept: LAB | Facility: HOSPITAL | Age: 85
End: 2025-08-11
Payer: MEDICARE

## 2025-08-11 DIAGNOSIS — D48.5 NEOPLASM OF UNCERTAIN BEHAVIOR OF SKIN: ICD-10-CM

## 2025-08-14 LAB
CYTO UR: NORMAL
LAB AP CASE REPORT: NORMAL
LAB AP CLINICAL INFORMATION: NORMAL
LAB AP DIAGNOSIS COMMENT: NORMAL
LAB AP INTRADEPARTMENTAL CONSULT: NORMAL
Lab: NORMAL
PATH REPORT.FINAL DX SPEC: NORMAL
PATH REPORT.GROSS SPEC: NORMAL